# Patient Record
Sex: FEMALE | Race: WHITE
[De-identification: names, ages, dates, MRNs, and addresses within clinical notes are randomized per-mention and may not be internally consistent; named-entity substitution may affect disease eponyms.]

---

## 2019-07-26 ENCOUNTER — HOSPITAL ENCOUNTER (EMERGENCY)
Dept: HOSPITAL 39 - ER | Age: 39
LOS: 1 days | Discharge: HOME | End: 2019-07-27
Payer: SELF-PAY

## 2019-07-26 VITALS — TEMPERATURE: 97.9 F
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
## 2019-07-26 DIAGNOSIS — S82.842A: Primary | ICD-10-CM

## 2019-07-26 DIAGNOSIS — Z87.891: ICD-10-CM

## 2019-07-26 DIAGNOSIS — I10: ICD-10-CM
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
## 2019-07-26 PROCEDURE — 94770: CPT

## 2019-07-26 PROCEDURE — 73610 X-RAY EXAM OF ANKLE: CPT

## 2019-07-26 PROCEDURE — 73560 X-RAY EXAM OF KNEE 1 OR 2: CPT

## 2019-07-26 NOTE — RAD
EXAM DESCRIPTION:



XR Knee, left 1 or 2 Views



CLINICAL HISTORY: 



38 years  Female  ankle fracture



TECHNIQUE: 



Two views of the left knee are provided.



COMPARISON: 



No prior exams available for comparison. 



FINDINGS: 



There is no acute left knee fracture, dislocation, or

suprapatellar joint effusion. Tiny tricompartmental osteophytes.

No aggressive osseous lesion.



IMPRESSION: 



No acute findings in the left knee. Tiny tricompartmental

osteophytes.



Electronically signed by:  Maggi Pittman MD  7/26/2019 10:31 PM CDT

Workstation: [id number]

## 2019-07-26 NOTE — RAD
EXAM DESCRIPTION:



XR Ankle, left 3 Views



CLINICAL HISTORY: 



38 years  Female  fall with left ankle injury



TECHNIQUE: 



Three views of the left ankle are provided.



COMPARISON: 



Comparison is made to the prior examination dated 10/25/2015. 



FINDINGS: 



Acute, bimalleolar fracture-subluxation of the left ankle. There

is 1.7 cm of lateral displacement of the talus and medial

malleolar fracture fragment. There is impaction and lateral

angulation at the distal fibular fracture. Surrounding soft

tissue swelling. No soft tissue gas or complete dislocation.



No visualized acute finding foot or midfoot fracture. No

aggressive osseous lesion. 



IMPRESSION:



Acute, bimalleolar fracture-subluxation of the left ankle as

described. No complete dislocation or soft tissue gas. Orthopedic

surgical evaluation recommended.



Electronically signed by:  Maggi Pittman MD  7/26/2019 10:07 PM CDT

Workstation: [id number]

## 2019-07-26 NOTE — ED.PDOC
History of Present Illness





- General


Chief Complaint: Trauma


Stated Complaint: left ankle pain


Time Seen by Provider: 07/26/19 21:55


Source: patient


Exam Limitations: no limitations





- History of Present Illness


Initial Comments: 





Patient presents with left ankle pain and deformity after stepping off a porch 

and losing her balance, twisting the ankle in the fall. She did not hit her head

nor have LOC. No chest pain before the fall. No dizziness nor light-headedness 

before the fall. No other complaints. 


Timing/Duration: 1/2 hour


Severity: moderate


Improving Factors: rest


Worsening Factors: movement


Associated Symptoms: denies symptoms


Allergies/Adverse Reactions: 


Allergies





NO KNOWN ALLERGY Allergy (Verified 10/25/15 09:43)


   





Home Medications: 


Ambulatory Orders





Naproxen Sodium [Anaprox DS] 550 mg PO BID #20 tab 10/25/15 


Acetaminophen W/ Codeine [Tylenol W/ CODEINE #3] 1 ea PO Q4HR PRN #20  07/27/19 











Review of Systems





- Review of Systems


Constitutional: States: no symptoms reported


EENTM: States: no symptoms reported


Respiratory: States: no symptoms reported


Cardiology: States: no symptoms reported


Gastrointestinal/Abdominal: States: no symptoms reported


Genitourinary: States: no symptoms reported


Musculoskeletal: States: see HPI


Skin: States: no symptoms reported


Neurological: States: no symptoms reported


Endocrine: States: no symptoms reported


Hematologic/Lymphatic: States: no symptoms reported





Past Medical History (General)





- Patient Medical History


Hx Hypertension: Yes





- Vaccination History


Hx Influenza Vaccination: No





- Social History


Hx Tobacco Use: Yes





Family Medical History





- Family History


  ** Mother


Family History: Unknown


Living Status: Unknown





Physical Exam





- Physical Exam


General Appearance: Obvious distress - moderate distress


Eye Exam: bilateral normal


Ears, Nose, Throat: normal ENT inspection


Neck: non-tender, full range of motion, supple


Respiratory: lungs clear, normal breath sounds


Cardiovascular/Chest: normal peripheral pulses, regular rate, rhythm, no edema


Gastrointestinal/Abdominal: normal bowel sounds, non tender


Extremity: other - left ankle is swollen medially and purple. There is external 

rotation of the ankle. Exquisitely TTP. The left toenail beds are pink with 

capillary refill less than two seconds. There is full sensation over the entire 

left foot. Left dorsal pedis and posterior tibialis pulses are 2+.


Neurologic: no motor/sensory deficits, alert, normal mood/affect, oriented x 3





Progress





- Progress


Progress: 





07/27/19 00:05


Radiographs of the left ankle showed bimalleolar fracture with laterally 

displaced talus.  Patient was put under procedural sedation with propafol and 

longitudinal traction applied with lateral pressure to the talus.  90 degree 

splint applied as well as a sugar tong splint to the LLE.  Patient tolerated 

procedure well. Patient had full sensation in the left foot. Capillary refill 

was less than 2 seconds. normal turgor and tone to the skin of the left foot.  

Patient given crutches and RX for tylenol #3. Instructed to follow up with Dr. Hernández on Monday. Care instructions given. E.R. warnings given. Questions were 

elicited and answered. Patient voiced understanding and agreement with the plan.





Departure





- Departure


Clinical Impression: 


 Fracture of fibula, Tibia fracture





Disposition: Discharge to Home or Self Care


Condition: Good


Departure Forms:  ED Discharge - Pt. Copy, Patient Portal Self Enrollment


Instructions:  Ankle Fracture (DC)


Diet: resume usual diet


Activity: other - No weight bearing on the left leg.


Prescriptions: 


Acetaminophen W/ Codeine [Tylenol W/ CODEINE #3] 1 ea PO Q4HR PRN #20 


 PRN Reason: Pain


Home Medications: 


Ambulatory Orders





Naproxen Sodium [Anaprox DS] 550 mg PO BID #20 tab 10/25/15 


Acetaminophen W/ Codeine [Tylenol W/ CODEINE #3] 1 ea PO Q4HR PRN #20  07/27/19 








Additional Instructions: 


Use crutches. Do not put weight on the left leg.  Elevate the left leg on a 

pillow at night while sleeping.  Take medications as directed. Call Dr. Hernández's 

office on Monday and get an appointment for Monday or Tuesday. Return to the 

E.R. for increasing pain in the left foot, loss of sensation in the toes, or if 

the toes become pale.

## 2019-07-27 VITALS — SYSTOLIC BLOOD PRESSURE: 120 MMHG | OXYGEN SATURATION: 95 % | DIASTOLIC BLOOD PRESSURE: 80 MMHG

## 2019-07-27 NOTE — RAD
CLINICAL HISTORY:  post reduction 



COMPARISON: Earlier the same day.



TECHNIQUE: XR ANKLE 3 OR MORE VIEWS 7/26/2019 11:22 PM CDT



FINDINGS: 



Again noted are displaced fractures of the medial and lateral

malleolus. There is subluxation of the tibiotalar joint. There is

diffuse soft tissue swelling.



IMPRESSION: 



Minimally improved alignment following closed reduction.



Electronically signed by:  Faisal Jha MD  7/27/2019 12:00 AM

CDT Workstation: [id number]

## 2019-08-05 NOTE — RAD
EXAM DESCRIPTION: 



Chest,2 Views



CLINICAL HISTORY: 

z01.811 pre op 



COMPARISON: 

None Available.



TECHNIQUE: 

PA and lateral views of the chest



FINDINGS: 

Cardiac silhouette shows normal heart size.

Pulmonary vascularity is within normal limits.

Lungs show no confluent infiltrates.

Costophrenic angles are sharp. No pneumothorax.

Visualized osseous structures show no destructive lesions. 



IMPRESSION: 

No acute cardiopulmonary process.



Electronically signed by:  Aman Infante MD  8/5/2019 10:27 AM CDT

Workstation: [id number]

## 2019-08-05 NOTE — HP
CHIEF COMPLAINT:  Left ankle pain.



HISTORY OF PRESENT ILLNESS:  Carmelita is a 38-year-old female with a history of pain 
in the right ankle.  She has had this pain going on since 19 and it is 
secondary to a fracture.  We saw her last week and the swelling was too 
extensive to do surgery on her.  At this point, however, her swelling has 
improved and we can proceed with surgical intervention.  She and I had 
previously discussed her smoking, which she at this point plans to no longer be 
doing.  After discussing the risks, benefits and alternatives to operative 
therapy for this, she has given informed consent.  



PAST SURGICAL HISTORY:  

1.  .



MEDICATIONS:  

1.  Tylenol with codeine.

2.  Aleve.



PAIN CONTRACT:  None.



ALLERGIES:  NO KNOWN DRUG ALLERGIES.



CODE STATUS:  Full code.



IMMUNIZATIONS:  Up to date.



SOCIAL HISTORY:  The patient does not drink or use any illicit drugs.  She no 
longer smokes.



FAMILY HISTORY:  None pertinent to today's complaint.



REVIEW OF SYSTEMS:  Negative except as indicated in the History of Present 
Illness.



PHYSICAL EXAMINATION:



VITAL SIGNS:  Blood pressure 150/102.  Pulse 111.  Height 5'10".  Weight 230 
pounds.



MENTAL STATUS:  The patient is awake, alert, and is able to give a good history 
and participate in the physical.  The patient is oriented to person, place and 
time.



SKIN:  Normal tone and turgor.



MUSCULOSKELETAL:  The ankle is diffusely swollen.  The skin is intact and it is 
compressible.  She has a warm and well perfused extremity.  Sensation is intact 
distally.  She has slight lateral translation of the foot secondary to the 
fracture, but on x-ray she does appear to have intact mortise.



ASSESSMENT:

1.  Bimalleolar fracture.



PLAN: The plan at this point is for open reduction internal fixation.  We have 
discussed the risks, benefits, and alternatives to that and the patient has 
given informed consent.



#[id number]

MTDD

## 2019-08-07 ENCOUNTER — HOSPITAL ENCOUNTER (OUTPATIENT)
Dept: HOSPITAL 39 - AMB | Age: 39
Discharge: HOME | End: 2019-08-07
Attending: ORTHOPAEDIC SURGERY
Payer: SELF-PAY

## 2019-08-07 VITALS — SYSTOLIC BLOOD PRESSURE: 119 MMHG | TEMPERATURE: 98 F | OXYGEN SATURATION: 94 % | DIASTOLIC BLOOD PRESSURE: 92 MMHG

## 2019-08-07 DIAGNOSIS — S82.842A: Primary | ICD-10-CM

## 2019-08-07 DIAGNOSIS — Z87.891: ICD-10-CM

## 2019-08-07 PROCEDURE — 87086 URINE CULTURE/COLONY COUNT: CPT

## 2019-08-07 PROCEDURE — 87070 CULTURE OTHR SPECIMN AEROBIC: CPT

## 2019-08-07 PROCEDURE — 73600 X-RAY EXAM OF ANKLE: CPT

## 2019-08-07 PROCEDURE — 81001 URINALYSIS AUTO W/SCOPE: CPT

## 2019-08-07 PROCEDURE — 93005 ELECTROCARDIOGRAM TRACING: CPT

## 2019-08-07 PROCEDURE — 27814 TREATMENT OF ANKLE FRACTURE: CPT

## 2019-08-07 PROCEDURE — 01480 ANES OPEN PX LOWER L/A/F NOS: CPT

## 2019-08-07 PROCEDURE — 36415 COLL VENOUS BLD VENIPUNCTURE: CPT

## 2019-08-07 PROCEDURE — 85025 COMPLETE CBC W/AUTO DIFF WBC: CPT

## 2019-08-07 PROCEDURE — 80307 DRUG TEST PRSMV CHEM ANLYZR: CPT

## 2019-08-07 PROCEDURE — 80048 BASIC METABOLIC PNL TOTAL CA: CPT

## 2019-08-07 PROCEDURE — 76000 FLUOROSCOPY <1 HR PHYS/QHP: CPT

## 2019-08-07 PROCEDURE — 71046 X-RAY EXAM CHEST 2 VIEWS: CPT

## 2019-08-07 PROCEDURE — 81025 URINE PREGNANCY TEST: CPT

## 2019-08-07 RX ADMIN — VANCOMYCIN HYDROCHLORIDE ONE MG: 500 INJECTION, POWDER, LYOPHILIZED, FOR SOLUTION INTRAVENOUS at 08:00

## 2019-08-07 RX ADMIN — SODIUM CHLORIDE, POTASSIUM CHLORIDE, SODIUM LACTATE AND CALCIUM CHLORIDE ONE MLS: 600; 310; 30; 20 INJECTION, SOLUTION INTRAVENOUS at 07:58

## 2019-08-07 RX ADMIN — VANCOMYCIN HYDROCHLORIDE ONE MG: 500 INJECTION, POWDER, LYOPHILIZED, FOR SOLUTION INTRAVENOUS at 11:55

## 2019-08-07 RX ADMIN — SODIUM CHLORIDE, POTASSIUM CHLORIDE, SODIUM LACTATE AND CALCIUM CHLORIDE ONE MLS: 600; 310; 30; 20 INJECTION, SOLUTION INTRAVENOUS at 12:56

## 2019-08-07 NOTE — RAD
EXAM:  XR Left Ankle, 2 Views



CLINICAL HISTORY:  POSTOP



TECHNIQUE:  Frontal and lateral views of the left ankle.



COMPARISON:  7/26/2019



FINDINGS:

  Limitations:  None.

  Bones/joints:  There has been interval internal fixation of

fractures of the distal fibula and medial malleolus.  Mild

residual displacement present.  Ankle mortise cannot be assessed

due to rotation.

      No dislocation.

  Soft tissues:  Diffuse soft tissue swelling present.



IMPRESSION:     

Improved position and alignment fractures of the distal fibula

and medial malleolus post internal fixation.



Electronically signed by:  Kelly Moreland MD  8/7/2019 6:13 PM

CDT Workstation: [id number]

## 2019-08-07 NOTE — RAD
PROVIDED CLINICAL HISTORY/REASON FOR EXAM: ORIF LEFT ANKLE 



Findings/impression: Two intraoperative fluoroscopic images of a

left ankle open reduction internal fixation.

Dose: Not provided mGy

Time: 1.09 minutes



Electronically signed by:  Edgar Cross MD  8/7/2019 3:00 PM

CDT Workstation: [id number]

## 2019-08-08 NOTE — OP
DATE OF PROCEDURE:  08/07/19



PREOPERATIVE DIAGNOSIS: 

1.  Left ankle fracture.



POSTOPERATIVE DIAGNOSIS: 

1.  Left ankle fracture.



PROCEDURE: 

1.  ORIF of ankle.



SURGEON:  Blayne Hernández MD



ASSISTANT:  Rayray Lin CST, SA-C



ANESTHESIA:  General anesthesia.



COMPLICATIONS:  None.



FINDINGS:  Bimalleolar ankle fracture.



INDICATION:  Carmelita has a history of a twisting injury with subsequent onset of 
acute ankle pain.  Because of that, x-rays were taken and revealed fracture of 
the ankle.  Following discussion of the risks, benefits and alternatives to 
operative therapy, informed consent was obtained for ORIF.



PROCEDURE:  The patient was brought to the Operating Room and placed in supine 
position.  General anesthesia was induced and the patient's leg was sterilely 
prepped and draped.  Following prepping and draping, an incision was made on the
lateral border of the fibula.  The fracture of the fibula was identified and the
fracture was reduced.  Locking plate was applied to the lateral border.  This 
did help stabilize the medial aspect.  Attention was focused on the medial side 
and an incision was made directly over the malleolus.  The medial malleolar 
fragment was reduced and 2 partially threaded cancellous screws were placed 
across the fracture line.  The ankle was then stressed and there was noted to be
no widening of the mortise.  At that point, I did not feel syndesmosis screws 
were indicated.  The wounds were thoroughly irrigated and closed.  Sterile 
dressings were placed.  She was placed in a splint and awoken from anesthesia.  
She was taken to the Recovery Room following that.



POSTOPERATIVE PLAN:  She is going to be non-weightbearing.  I have really 
expressed to her the importance of smoking cessation.  I have discussed this 
with her on multiple occasions and will continue to stress this as hopefully 
healing progresses.  



#[id number]

[facility]

## 2019-09-06 ENCOUNTER — HOSPITAL ENCOUNTER (OUTPATIENT)
Dept: HOSPITAL 39 - RAD | Age: 39
End: 2019-09-06
Attending: ORTHOPAEDIC SURGERY
Payer: SELF-PAY

## 2019-09-06 DIAGNOSIS — S82.842D: Primary | ICD-10-CM

## 2019-09-06 DIAGNOSIS — Z98.890: ICD-10-CM

## 2019-09-06 NOTE — RAD
EXAM DESCRIPTION: Ankle,Left 3 Views



CLINICAL HISTORY: 38 years, Female, DISPLACED BIMALLEOLAR

FRACTURE OF LEFT LOWER LEG



COMPARISON: Previous x-ray images of the left ankle August 2, 2019



TECHNIQUE: AP/lateral/oblique of the left ankle



FINDINGS:



Orthopedic plate and screws in the distal left fibula with

orthopedic screws through the medial malleolus. Widened space

between the medial malleolus and the medial aspect of the talus

measures 9 mm and is consistent with deltoid ligament disruption.

Lateral view shows intact talus and calcaneus. Small dorsal and

moderate plantar calcaneal enthesophytes. Fracture fragments are

anatomically aligned.



IMPRESSION:



Status post plate and screw fixation of bimalleolar fractures.

See above.



Electronically signed by:  Mike Huston MD  9/6/2019 8:47 AM

CDT Workstation: [id number]

## 2019-10-07 ENCOUNTER — HOSPITAL ENCOUNTER (OUTPATIENT)
Dept: HOSPITAL 39 - RAD | Age: 39
End: 2019-10-07
Attending: ORTHOPAEDIC SURGERY
Payer: SELF-PAY

## 2019-10-07 DIAGNOSIS — S82.842D: Primary | ICD-10-CM

## 2019-10-07 NOTE — RAD
EXAM DESCRIPTION: Ankle,Left 3 Views



CLINICAL HISTORY: 38 years Female, BIMALLEOLAR FX OF ANKLE



COMPARISON: September 6, 2019



Findings: 



Similar widening of the ankle mortise medially, suggestive of

deltoid ligament complex disruption. Redemonstrated bimalleolar

ORIF. Stable alignment. No hardware fracture or significant

perihardware lucency identified. No new focal soft tissue

swelling. Decreased fracture lucencies. No new fracture

identified. Tiny plantar calcaneal enthesophyte.



IMPRESSION:

Healing internally fixated left ankle bimalleolar fracture.



Electronically signed by:  Edgar Cross MD  10/7/2019 10:27 AM

CDT Workstation: [id number]

## 2019-10-24 ENCOUNTER — HOSPITAL ENCOUNTER (OUTPATIENT)
Dept: HOSPITAL 39 - RAD | Age: 39
End: 2019-10-24
Attending: ORTHOPAEDIC SURGERY
Payer: SELF-PAY

## 2019-10-24 DIAGNOSIS — Z98.890: ICD-10-CM

## 2019-10-24 DIAGNOSIS — S82.842D: Primary | ICD-10-CM

## 2019-10-24 NOTE — RAD
EXAM DESCRIPTION: Ankle x-ray,Left 3 Views



CLINICAL HISTORY: 38 years, Female, BIMALLEOLAR FRACTURE OF LEFT

ANKLE



COMPARISON: Previous x-rays of the left ankle October 7, 2019



TECHNIQUE: AP/lateral/oblique of the left ankle



FINDINGS:



Orthopedic hardware in the distal tibia and distal fibula

stabilizing malleolar fractures. Widened space between the talus

and the medial malleolus consistent with deltoid ligament

disruption. Widened interosseous distance between distal fibula

and tibia consistent with interosseous ligament disruption. There

is some soft tissue calcification in this area. There is moderate

soft tissue swelling laterally and medially. Moderate soft tissue

swelling around the ankle is seen on the lateral view.



Intact proximal metatarsals.  Intact dome of the talus.



Lateral view shows no evidence of fracture of the body of the

talus or calcaneus. Mild dorsal and plantar calcaneal spurring is

seen.



IMPRESSION:



Orthopedic hardware in the distal right tibia and fibula.



Electronically signed by:  Mike Huston MD  10/24/2019 11:13

AM CDT Workstation: [id number]

## 2019-11-14 ENCOUNTER — HOSPITAL ENCOUNTER (OUTPATIENT)
Dept: HOSPITAL 39 - RAD | Age: 39
End: 2019-11-14
Attending: ORTHOPAEDIC SURGERY
Payer: SELF-PAY

## 2019-11-14 DIAGNOSIS — Z98.890: ICD-10-CM

## 2019-11-14 DIAGNOSIS — S82.842D: Primary | ICD-10-CM

## 2019-11-14 NOTE — RAD
EXAM DESCRIPTION: Ankle,Left 3 Views



CLINICAL HISTORY: 38 years, Female, BIMALLEOLAR FRACTURE OF LEFT

LOWER LEG



COMPARISON: Previous x-rays of the left ankle October 24, 2019



TECHNIQUE: AP/lateral/oblique of the left ankle



FINDINGS:



Plate and screws in the distal fibula. Screws are seen through

the medial malleolus of the distal tibia. Widened space between

the medial malleolus and the talus consistent with disruption of

the deltoid ligament. Soft tissue swelling is mild over the

malleoli. No fracture of the talar dome. Lateral view shows

intact body of the talus and calcaneus. Small plantar and dorsal

calcaneal enthesophytes. Bone fragment projecting posterior to

the distal tibia is consistent with posterior malleolar fracture

with mild proximal displacement. This was present on the previous

study.



IMPRESSION:



Orthopedic hardware in the left ankle stabilizing fractures as

described.





Electronically signed by:  Mike Huston MD  11/14/2019 8:22

AM [facility] Workstation: [id number]